# Patient Record
Sex: MALE | Race: OTHER | Employment: UNEMPLOYED | URBAN - METROPOLITAN AREA
[De-identification: names, ages, dates, MRNs, and addresses within clinical notes are randomized per-mention and may not be internally consistent; named-entity substitution may affect disease eponyms.]

---

## 2021-12-07 ENCOUNTER — APPOINTMENT (OUTPATIENT)
Dept: GENERAL RADIOLOGY | Age: 26
End: 2021-12-07
Payer: MEDICAID

## 2021-12-07 ENCOUNTER — APPOINTMENT (OUTPATIENT)
Dept: CT IMAGING | Age: 26
End: 2021-12-07
Payer: MEDICAID

## 2021-12-07 ENCOUNTER — HOSPITAL ENCOUNTER (EMERGENCY)
Age: 26
Discharge: ANOTHER HOSPITAL | End: 2021-12-07
Attending: EMERGENCY MEDICINE
Payer: MEDICAID

## 2021-12-07 ENCOUNTER — HOSPITAL ENCOUNTER (INPATIENT)
Age: 26
LOS: 4 days | Discharge: HOME | DRG: 751 | End: 2021-12-11
Attending: PSYCHIATRY & NEUROLOGY | Admitting: PSYCHIATRY & NEUROLOGY
Payer: MEDICAID

## 2021-12-07 VITALS
OXYGEN SATURATION: 98 % | SYSTOLIC BLOOD PRESSURE: 110 MMHG | RESPIRATION RATE: 18 BRPM | WEIGHT: 200 LBS | HEART RATE: 75 BPM | TEMPERATURE: 98 F | BODY MASS INDEX: 29.62 KG/M2 | DIASTOLIC BLOOD PRESSURE: 70 MMHG | HEIGHT: 69 IN

## 2021-12-07 DIAGNOSIS — F19.10 POLYSUBSTANCE ABUSE (HCC): ICD-10-CM

## 2021-12-07 DIAGNOSIS — R07.9 NONSPECIFIC CHEST PAIN: ICD-10-CM

## 2021-12-07 DIAGNOSIS — R45.851 SUICIDAL IDEATION: Primary | ICD-10-CM

## 2021-12-07 PROBLEM — F32.A DEPRESSION: Status: ACTIVE | Noted: 2021-12-07

## 2021-12-07 LAB
ACETAMINOPHEN LEVEL: <5 UG/ML (ref 10–30)
ALBUMIN SERPL-MCNC: 4.4 G/DL (ref 3.4–5)
ALP BLD-CCNC: 67 U/L (ref 40–129)
ALT SERPL-CCNC: 17 U/L (ref 10–40)
AMPHETAMINE SCREEN, URINE: POSITIVE
ANION GAP SERPL CALCULATED.3IONS-SCNC: 15 MMOL/L (ref 3–16)
AST SERPL-CCNC: 24 U/L (ref 15–37)
BARBITURATE SCREEN URINE: ABNORMAL
BASOPHILS ABSOLUTE: 0 K/UL (ref 0–0.2)
BASOPHILS RELATIVE PERCENT: 0.3 %
BENZODIAZEPINE SCREEN, URINE: ABNORMAL
BILIRUB SERPL-MCNC: 0.3 MG/DL (ref 0–1)
BILIRUBIN DIRECT: <0.2 MG/DL (ref 0–0.3)
BILIRUBIN URINE: NEGATIVE
BILIRUBIN, INDIRECT: NORMAL MG/DL (ref 0–1)
BLOOD, URINE: NEGATIVE
BUN BLDV-MCNC: 9 MG/DL (ref 7–20)
CALCIUM SERPL-MCNC: 9.5 MG/DL (ref 8.3–10.6)
CANNABINOID SCREEN URINE: POSITIVE
CHLORIDE BLD-SCNC: 102 MMOL/L (ref 99–110)
CLARITY: CLEAR
CO2: 23 MMOL/L (ref 21–32)
COCAINE METABOLITE SCREEN URINE: ABNORMAL
COLOR: YELLOW
CREAT SERPL-MCNC: 1.1 MG/DL (ref 0.9–1.3)
EOSINOPHILS ABSOLUTE: 0.1 K/UL (ref 0–0.6)
EOSINOPHILS RELATIVE PERCENT: 1.1 %
ETHANOL: NORMAL MG/DL (ref 0–0.08)
GFR AFRICAN AMERICAN: >60
GFR NON-AFRICAN AMERICAN: >60
GLUCOSE BLD-MCNC: 90 MG/DL (ref 70–99)
GLUCOSE URINE: NEGATIVE MG/DL
HCT VFR BLD CALC: 44.2 % (ref 40.5–52.5)
HEMOGLOBIN: 14.2 G/DL (ref 13.5–17.5)
KETONES, URINE: ABNORMAL MG/DL
LEUKOCYTE ESTERASE, URINE: NEGATIVE
LYMPHOCYTES ABSOLUTE: 2.5 K/UL (ref 1–5.1)
LYMPHOCYTES RELATIVE PERCENT: 21.7 %
Lab: ABNORMAL
MAGNESIUM: 2.4 MG/DL (ref 1.8–2.4)
MCH RBC QN AUTO: 23.9 PG (ref 26–34)
MCHC RBC AUTO-ENTMCNC: 32.2 G/DL (ref 31–36)
MCV RBC AUTO: 74.3 FL (ref 80–100)
METHADONE SCREEN, URINE: ABNORMAL
MICROSCOPIC EXAMINATION: ABNORMAL
MONOCYTES ABSOLUTE: 1.1 K/UL (ref 0–1.3)
MONOCYTES RELATIVE PERCENT: 9.3 %
NEUTROPHILS ABSOLUTE: 7.8 K/UL (ref 1.7–7.7)
NEUTROPHILS RELATIVE PERCENT: 67.6 %
NITRITE, URINE: NEGATIVE
OPIATE SCREEN URINE: ABNORMAL
OXYCODONE URINE: ABNORMAL
PDW BLD-RTO: 16.9 % (ref 12.4–15.4)
PH UA: 6
PH UA: 6 (ref 5–8)
PHENCYCLIDINE SCREEN URINE: ABNORMAL
PLATELET # BLD: 317 K/UL (ref 135–450)
PMV BLD AUTO: 8.4 FL (ref 5–10.5)
POTASSIUM REFLEX MAGNESIUM: 3.5 MMOL/L (ref 3.5–5.1)
PROPOXYPHENE SCREEN: ABNORMAL
PROTEIN UA: NEGATIVE MG/DL
RBC # BLD: 5.95 M/UL (ref 4.2–5.9)
SALICYLATE, SERUM: <0.3 MG/DL (ref 15–30)
SARS-COV-2, NAAT: NOT DETECTED
SODIUM BLD-SCNC: 140 MMOL/L (ref 136–145)
SPECIFIC GRAVITY UA: 1.02 (ref 1–1.03)
TOTAL PROTEIN: 7 G/DL (ref 6.4–8.2)
TROPONIN: <0.01 NG/ML
URINE REFLEX TO CULTURE: ABNORMAL
URINE TYPE: ABNORMAL
UROBILINOGEN, URINE: 1 E.U./DL
WBC # BLD: 11.5 K/UL (ref 4–11)

## 2021-12-07 PROCEDURE — 81003 URINALYSIS AUTO W/O SCOPE: CPT

## 2021-12-07 PROCEDURE — 99285 EMERGENCY DEPT VISIT HI MDM: CPT

## 2021-12-07 PROCEDURE — 82077 ASSAY SPEC XCP UR&BREATH IA: CPT

## 2021-12-07 PROCEDURE — 83735 ASSAY OF MAGNESIUM: CPT

## 2021-12-07 PROCEDURE — 80307 DRUG TEST PRSMV CHEM ANLYZR: CPT

## 2021-12-07 PROCEDURE — 36415 COLL VENOUS BLD VENIPUNCTURE: CPT

## 2021-12-07 PROCEDURE — 87635 SARS-COV-2 COVID-19 AMP PRB: CPT

## 2021-12-07 PROCEDURE — 80048 BASIC METABOLIC PNL TOTAL CA: CPT

## 2021-12-07 PROCEDURE — 80076 HEPATIC FUNCTION PANEL: CPT

## 2021-12-07 PROCEDURE — 1240000000 HC EMOTIONAL WELLNESS R&B

## 2021-12-07 PROCEDURE — 71045 X-RAY EXAM CHEST 1 VIEW: CPT

## 2021-12-07 PROCEDURE — 85025 COMPLETE CBC W/AUTO DIFF WBC: CPT

## 2021-12-07 PROCEDURE — 84484 ASSAY OF TROPONIN QUANT: CPT

## 2021-12-07 PROCEDURE — 80179 DRUG ASSAY SALICYLATE: CPT

## 2021-12-07 PROCEDURE — 93005 ELECTROCARDIOGRAM TRACING: CPT | Performed by: EMERGENCY MEDICINE

## 2021-12-07 PROCEDURE — 80143 DRUG ASSAY ACETAMINOPHEN: CPT

## 2021-12-07 PROCEDURE — 70450 CT HEAD/BRAIN W/O DYE: CPT

## 2021-12-07 RX ORDER — ACETAMINOPHEN 325 MG/1
650 TABLET ORAL EVERY 4 HOURS PRN
Status: DISCONTINUED | OUTPATIENT
Start: 2021-12-07 | End: 2021-12-11 | Stop reason: HOSPADM

## 2021-12-07 RX ORDER — HYDROXYZINE PAMOATE 50 MG/1
50 CAPSULE ORAL EVERY 6 HOURS PRN
Status: DISCONTINUED | OUTPATIENT
Start: 2021-12-07 | End: 2021-12-11 | Stop reason: HOSPADM

## 2021-12-07 RX ORDER — TRAZODONE HYDROCHLORIDE 50 MG/1
50 TABLET ORAL NIGHTLY PRN
Status: DISCONTINUED | OUTPATIENT
Start: 2021-12-07 | End: 2021-12-11 | Stop reason: HOSPADM

## 2021-12-07 RX ORDER — IBUPROFEN 800 MG/1
800 TABLET ORAL EVERY 8 HOURS PRN
Status: DISCONTINUED | OUTPATIENT
Start: 2021-12-07 | End: 2021-12-11 | Stop reason: HOSPADM

## 2021-12-07 RX ORDER — POLYETHYLENE GLYCOL 3350 17 G
2 POWDER IN PACKET (EA) ORAL
Status: DISCONTINUED | OUTPATIENT
Start: 2021-12-07 | End: 2021-12-10 | Stop reason: SDDI

## 2021-12-07 RX ORDER — ACETAMINOPHEN 325 MG/1
650 TABLET ORAL EVERY 4 HOURS PRN
Status: DISCONTINUED | OUTPATIENT
Start: 2021-12-07 | End: 2021-12-07

## 2021-12-07 ASSESSMENT — PAIN SCALES - GENERAL
PAINLEVEL_OUTOF10: 3
PAINLEVEL_OUTOF10: 0

## 2021-12-07 ASSESSMENT — SLEEP AND FATIGUE QUESTIONNAIRES: SLEEP PATTERN: UTA

## 2021-12-07 ASSESSMENT — PAIN DESCRIPTION - PAIN TYPE: TYPE: ACUTE PAIN

## 2021-12-07 ASSESSMENT — HEART SCORE: ECG: 1

## 2021-12-07 NOTE — ED NOTES
Pt endorsed to [employer]. Pt handed off in stable condition with no acute signs of distress noted. Pt sleeping in stretcher with no acute signs of distress noted.       Karolyn James RN  12/07/21 4106

## 2021-12-07 NOTE — ED NOTES
Pt sleeping in stretcher with no acute signs of distress noted. Pt sitter in close proximity.       Satya Moya RN  12/07/21 4846

## 2021-12-07 NOTE — ED NOTES
Pt asleep in stretcher with no acute signs of distress noted. Pt sitter in close proximity.       Valentino Ricardo RN  12/07/21 3671

## 2021-12-07 NOTE — ED NOTES
Pt report and paperwork given to [facility]. Pt made aware of transfer and verbalized understanding. Pt belongings retrieved from security and given to [facility]. Pt was secured to the stretcher and wheeled from the ED without incident. Pt allowed opportunity to ask questions and declined any questions at this time. Pts preferred contact Triny Merino was updated on his transfer. Report called to Kapil Gonzalez RN at [facility].      Jenness Minors) Bayron Basilio RN  12/07/21 9818

## 2021-12-07 NOTE — ED PROVIDER NOTES
EMERGENCY DEPARTMENT PROVIDER NOTE    Patient Identification  Pt Name: Bautista Perla  MRN: [id number]  [place] 1995  Date of evaluation: 12/7/2021  Provider: Destini Daniels DO  PCP: Yoan Mckeon    Chief Complaint  Suicidal ideation      HPI  (History provided by patient)  This is a 32 y.o. male with pertinent past medical history of ADHD who was brought in by family for depression, patient states he has had worsening depression over the past week. Over the past several days he has had thoughts about committing suicide, states he would plan to hang himself. Patient is unable to identify any clear triggers for his worsening symptoms on my evaluation. Nothing in particular seems to make him feel any better or worse. He denies history of depression or suicide attempts in the past.  Denies prior psychiatric hospitalizations. Reports he has been drinking alcohol tonight, however denies any other recreational drug use. Patient also states his chest feels \"terrible\" ongoing for the past week as well. Patient is very drowsy during my initial interview and has to be repeatedly awakened to answer questions. ROS    Const:  No fevers, no chills, +fatigue  Skin:  No rash, no lesions  Eyes:  No visual changes, no blurry or double vision, no pain  ENT:  No sore throat, no difficulty swallowing, no ear pain, no sinus pain or congestion  Card:  +chest pain, no palpitations, no edema  Resp:  No shortness of breath, no cough, no wheezing  Abd:  No abdominal pain, no nausea, no vomiting, no diarrhea  Genitourinary:  No dysuria, no hematuria  MSK:  No joint pain, no myalgia  Neuro:  No focal weakness, no headache, no paresthesia    All other systems reviewed and negative unless otherwise noted in HPI        I have reviewed the following nursing documentation:  Allergies: Patient has no known allergies.     Past medical history:   Past Medical History:   Diagnosis Date    ADD (attention deficit disorder with mm J-point elevations in septal and lateral leads, no reciprocal ST depressions, T wave inversions V2 and V3, impression NSR with nonspecific ST-T morphology, suspect benign early repolarization, no STEMI, no comparison available      Radiology  XR CHEST PORTABLE   Final Result   No significant findings in the chest.         CT Head WO Contrast   Final Result   No acute intracranial abnormality.              Labs  Results for orders placed or performed during the hospital encounter of 12/07/21   COVID-19, Rapid    Specimen: Nasopharyngeal Swab   Result Value Ref Range    SARS-CoV-2, NAAT Not Detected Not Detected   CBC Auto Differential   Result Value Ref Range    WBC 11.5 (H) 4.0 - 11.0 K/uL    RBC 5.95 (H) 4.20 - 5.90 M/uL    Hemoglobin 14.2 13.5 - 17.5 g/dL    Hematocrit 44.2 40.5 - 52.5 %    MCV 74.3 (L) 80.0 - 100.0 fL    MCH 23.9 (L) 26.0 - 34.0 pg    MCHC 32.2 31.0 - 36.0 g/dL    RDW 16.9 (H) 12.4 - 15.4 %    Platelets 143 565 - 614 K/uL    MPV 8.4 5.0 - 10.5 fL    Neutrophils % 67.6 %    Lymphocytes % 21.7 %    Monocytes % 9.3 %    Eosinophils % 1.1 %    Basophils % 0.3 %    Neutrophils Absolute 7.8 (H) 1.7 - 7.7 K/uL    Lymphocytes Absolute 2.5 1.0 - 5.1 K/uL    Monocytes Absolute 1.1 0.0 - 1.3 K/uL    Eosinophils Absolute 0.1 0.0 - 0.6 K/uL    Basophils Absolute 0.0 0.0 - 0.2 K/uL   Basic Metabolic Panel w/ Reflex to MG   Result Value Ref Range    Sodium 140 136 - 145 mmol/L    Potassium reflex Magnesium 3.5 3.5 - 5.1 mmol/L    Chloride 102 99 - 110 mmol/L    CO2 23 21 - 32 mmol/L    Anion Gap 15 3 - 16    Glucose 90 70 - 99 mg/dL    BUN 9 7 - 20 mg/dL    CREATININE 1.1 0.9 - 1.3 mg/dL    GFR Non-African American >60 >60    GFR African American >60 >60    Calcium 9.5 8.3 - 10.6 mg/dL   Troponin   Result Value Ref Range    Troponin <0.01 <0.01 ng/mL   Urinalysis Reflex to Culture    Specimen: Urine, clean catch   Result Value Ref Range    Color, UA YELLOW Straw/Yellow    Clarity, UA Clear Clear    Glucose, Ur Negative Negative mg/dL    Bilirubin Urine Negative Negative    Ketones, Urine TRACE (A) Negative mg/dL    Specific Gravity, UA 1.025 1.005 - 1.030    Blood, Urine Negative Negative    pH, UA 6.0 5.0 - 8.0    Protein, UA Negative Negative mg/dL    Urobilinogen, Urine 1.0 <2.0 E.U./dL    Nitrite, Urine Negative Negative    Leukocyte Esterase, Urine Negative Negative    Microscopic Examination Not Indicated     Urine Type NotGiven     Urine Reflex to Culture Not Indicated    Urine Drug Screen   Result Value Ref Range    Amphetamine Screen, Urine POSITIVE (A) Negative <1000ng/mL    Barbiturate Screen, Ur Neg Negative <200 ng/mL    Benzodiazepine Screen, Urine Neg Negative <200 ng/mL    Cannabinoid Scrn, Ur POSITIVE (A) Negative <50 ng/mL    Cocaine Metabolite Screen, Urine Neg Negative <300 ng/mL    Opiate Scrn, Ur Neg Negative <300 ng/mL    PCP Screen, Urine Neg Negative <25 ng/mL    Methadone Screen, Urine Neg Negative <300 ng/mL    Propoxyphene Scrn, Ur Neg Negative <300 ng/mL    Oxycodone Urine Neg Negative <100 ng/ml    pH, UA 6.0     Drug Screen Comment: see below    Ethanol   Result Value Ref Range    Ethanol Lvl None Detected mg/dL   Acetaminophen level   Result Value Ref Range    Acetaminophen Level <5 (L) 10 - 30 ug/mL   Salicylate   Result Value Ref Range    Salicylate, Serum <9.1 (L) 15.0 - 30.0 mg/dL   Magnesium   Result Value Ref Range    Magnesium 2.40 1.80 - 2.40 mg/dL   EKG 12 Lead   Result Value Ref Range    Ventricular Rate 69 BPM    Atrial Rate 69 BPM    P-R Interval 154 ms    QRS Duration 92 ms    Q-T Interval 376 ms    QTc Calculation (Bazett) 402 ms    P Axis 42 degrees    R Axis 60 degrees    T Axis 39 degrees    Diagnosis       Sinus rhythm with marked sinus arrhythmiaST elevation, consider early repolarization, pericarditis, or injuryT wave abnormality, consider anterior ischemia       Screenings     Heart Score for chest pain patients  History: Slightly Suspicious  ECG: Non-Specifc repolarization disturbance/LBTB/PM  Patient Age: < 45 years  *Risk factors for Atherosclerotic disease: Cigarette smoking  Risk Factors: 1 or 2 risk factors  Troponin: < 1X normal limit  Heart Score Total: 2     MDM and ED Course    Patient afebrile and nontoxic. No distress. AVSS, no hypoxia or increased work of breathing. On my initial evaluation, patient is drowsy, however he is fully oriented, answers questions appropriately. Neuro exam is nonfocal.  CT head shows no evidence of hemorrhage, mass-effect or other acute process. EKG with nonspecific ST-T morphology, patient is of young age with no cardiovascular risk factors aside from tobacco use and EKG is most consistent with benign early repolarization. Low risk by HEART score. Low suspicion for pericarditis. Laboratory work-up is without evidence of endorgan dysfunction or clinically significant electrolyte derangement. Tox work-up shows cannabinoids and amphetamines, EtOH negative, acetaminophen and salicylates negative. Patient continues to report suicidal ideation on my exam with plan to hang himself. Patient would benefit from transfer for urgent psychiatric evaluation. He was placed on a psychiatric hold. At this time he is medically cleared for transfer. Final Impression  1. Suicidal ideation    2. Polysubstance abuse ([facility] [address]    3. Nonspecific chest pain        Blood pressure 127/81, pulse 72, temperature 98 °F (36.7 °C), temperature source Oral, resp. rate 20, height 5' 9\" (1.753 m), weight 200 lb (90.7 kg), SpO2 100 %. Disposition:  DISPOSITION        Patient Referrals:  No follow-up provider specified. Discharge Medications:  New Prescriptions    No medications on file       Discontinued Medications:  Discontinued Medications    No medications on file       This chart was generated using the [employer] dictation system. I created this record but it may contain dictation errors given the limitations of this technology.     Matthew Ricardo DO Sheila (electronically signed)  Attending Emergency Physician       Maday Humphreys DO  12/07/21 7529

## 2021-12-07 NOTE — ED NOTES
Pt report received from [facility]. No acute signs of distress noted. Pt is asleep in bed.       Michell Long RN  12/07/21 81 [facility] ([facility]uy Jha) Sarah Long RN  12/07/21 4766

## 2021-12-08 PROBLEM — F33.9 MAJOR DEPRESSION, RECURRENT (HCC): Status: ACTIVE | Noted: 2021-12-08

## 2021-12-08 LAB
EKG ATRIAL RATE: 69 BPM
EKG DIAGNOSIS: NORMAL
EKG P AXIS: 42 DEGREES
EKG P-R INTERVAL: 154 MS
EKG Q-T INTERVAL: 376 MS
EKG QRS DURATION: 92 MS
EKG QTC CALCULATION (BAZETT): 402 MS
EKG R AXIS: 60 DEGREES
EKG T AXIS: 39 DEGREES
EKG VENTRICULAR RATE: 69 BPM

## 2021-12-08 PROCEDURE — 93010 ELECTROCARDIOGRAM REPORT: CPT | Performed by: INTERNAL MEDICINE

## 2021-12-08 PROCEDURE — 99223 1ST HOSP IP/OBS HIGH 75: CPT | Performed by: PSYCHIATRY & NEUROLOGY

## 2021-12-08 PROCEDURE — 6370000000 HC RX 637 (ALT 250 FOR IP): Performed by: PSYCHIATRY & NEUROLOGY

## 2021-12-08 PROCEDURE — 1240000000 HC EMOTIONAL WELLNESS R&B

## 2021-12-08 RX ORDER — OLANZAPINE 10 MG/1
10 INJECTION, POWDER, LYOPHILIZED, FOR SOLUTION INTRAMUSCULAR EVERY 8 HOURS PRN
Status: DISCONTINUED | OUTPATIENT
Start: 2021-12-08 | End: 2021-12-11 | Stop reason: HOSPADM

## 2021-12-08 RX ORDER — MAGNESIUM HYDROXIDE/ALUMINUM HYDROXICE/SIMETHICONE 120; 1200; 1200 MG/30ML; MG/30ML; MG/30ML
30 SUSPENSION ORAL EVERY 6 HOURS PRN
Status: DISCONTINUED | OUTPATIENT
Start: 2021-12-08 | End: 2021-12-11 | Stop reason: HOSPADM

## 2021-12-08 RX ORDER — ACETAMINOPHEN 325 MG/1
650 TABLET ORAL EVERY 4 HOURS PRN
Status: DISCONTINUED | OUTPATIENT
Start: 2021-12-08 | End: 2021-12-08

## 2021-12-08 RX ORDER — HYDROXYZINE PAMOATE 50 MG/1
50 CAPSULE ORAL EVERY 6 HOURS PRN
Status: DISCONTINUED | OUTPATIENT
Start: 2021-12-08 | End: 2021-12-08

## 2021-12-08 RX ORDER — OLANZAPINE 10 MG/1
10 TABLET ORAL EVERY 8 HOURS PRN
Status: DISCONTINUED | OUTPATIENT
Start: 2021-12-08 | End: 2021-12-11 | Stop reason: HOSPADM

## 2021-12-08 RX ORDER — TRAZODONE HYDROCHLORIDE 50 MG/1
50 TABLET ORAL NIGHTLY PRN
Status: DISCONTINUED | OUTPATIENT
Start: 2021-12-08 | End: 2021-12-08

## 2021-12-08 RX ORDER — BENZTROPINE MESYLATE 1 MG/ML
2 INJECTION INTRAMUSCULAR; INTRAVENOUS 2 TIMES DAILY PRN
Status: DISCONTINUED | OUTPATIENT
Start: 2021-12-08 | End: 2021-12-11 | Stop reason: HOSPADM

## 2021-12-08 RX ORDER — IBUPROFEN 400 MG/1
400 TABLET ORAL EVERY 6 HOURS PRN
Status: DISCONTINUED | OUTPATIENT
Start: 2021-12-08 | End: 2021-12-08

## 2021-12-08 RX ADMIN — TRAZODONE HYDROCHLORIDE 50 MG: 50 TABLET ORAL at 20:52

## 2021-12-08 ASSESSMENT — LIFESTYLE VARIABLES: HISTORY_ALCOHOL_USE: COMMENT

## 2021-12-08 ASSESSMENT — PAIN SCALES - GENERAL: PAINLEVEL_OUTOF10: 0

## 2021-12-08 NOTE — FLOWSHEET NOTE
Purposeful Rounding    Patient Location: Patient room    Patient willing to engage in conversation: No    Presentation/behavior: Withdrawn    Affect: Flat/blunted    Concerns reported: none, pt withdrawn     PRN medications given: none    Environmental assessment: Room free from clutter, Clear path to bathroom  and No safety hazards noted    Fall prevention interventions in place: Yellow non-skid socks on    Daily Hayes Fall Risk Score: 59    Daily Armenta Fall Risk Score: low      Electronically signed by Vanessa Umaña RN on 12/8/21 at 10:47 AM EST

## 2021-12-08 NOTE — FLOWSHEET NOTE
Purposeful Rounding    Patient Location: Patient room    Patient willing to engage in conversation: No    Presentation/behavior: Other sleeping*    Affect: Neutral/Euthymic(normal)    Concerns reported: pt noted to be resting, eyes closed, respirations even and easy    PRN medications given: none    Environmental assessment: Room free from clutter, Clear path to bathroom  and No safety hazards noted    Fall prevention interventions in place: Yellow non-skid socks on    Daily Ozan Fall Risk Score: 59    Daily Armenta Fall Risk Score: low      Electronically signed by Ra Vargas RN on 12/8/21 at 4:25 PM EST

## 2021-12-08 NOTE — FLOWSHEET NOTE
Purposeful Rounding    Patient Location: Patient room    Patient willing to engage in conversation: No    Presentation/behavior: Withdrawn    Affect: Neutral/Euthymic(normal)    Concerns reported: pt refuses to answer but is awake    PRN medications given: none    Environmental assessment: Room free from clutter, Clear path to bathroom  and No safety hazards noted    Fall prevention interventions in place: Yellow non-skid socks on    Daily Vernon Fall Risk Score: 59    Daily Armenta Fall Risk Score: low      Electronically signed by Indu Sena RN on 12/8/21 at 2:19 PM EST
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
## 2021-12-08 NOTE — PROGRESS NOTES
Patient gave verbal permission for his step-mother,  Alix Huber to receive information regarding his care. ISABEL form updated.
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
## 2021-12-08 NOTE — PROGRESS NOTES
..Purposeful Rounding    Patient Location: Patient room    Patient willing to engage in conversation: No    Presentation/behavior: Withdrawn and Resistive    Affect: Depressed    Concerns reported: no    PRN medications given: no    Environmental assessment: Room free from clutter, Clear path to bathroom  and Adequate lighting    Fall prevention interventions in place: Lighting appropriate, Room free of clutter and Clear path to bathroom    Daily Hallie Fall Risk Score: 59    Daily Armenta Fall Risk Score: low        Electronically signed by Tanner Gonzalez RN on 12/7/21 at 8:16 PM EST Ambulatory

## 2021-12-08 NOTE — PROGRESS NOTES
Behavioral Services  Medicare Certification Upon Admission    I certify that this patient's inpatient psychiatric hospital admission is medically necessary for:    [x] (1) Treatment which could reasonably be expected to improve this patient's condition,       [x] (2) Or for diagnostic study;     AND     [x](2) The inpatient psychiatric services are provided while the individual is under the care of a physician and are included in the individualized plan of care.     Estimated length of stay/service 2-3 d    Plan for post-hospital care outpt    Electronically signed by David Nevarez MD on 12/8/2021 at 11:22 AM

## 2021-12-08 NOTE — FLOWSHEET NOTE
Purposeful Rounding    Patient Location: Patient room    Patient willing to engage in conversation: No    Presentation/behavior: Other resting*    Affect: Neutral/Euthymic(normal)    Concerns reported: pt resting, eyes closed respirations even and easy    PRN medications given: none    Environmental assessment: Room free from clutter, Clear path to bathroom  and No safety hazards noted    Fall prevention interventions in place: Yellow non-skid socks on    Daily Hallie Fall Risk Score: 59    Daily Armenta Fall Risk Score: low      Electronically signed by Vanessa Umaña RN on 12/8/21 at 2:41 PM EST

## 2021-12-08 NOTE — PLAN OF CARE
Problem: Altered Mood, Depressive Behavior:  Goal: Able to verbalize acceptance of life and situations over which he or she has no control  Description: Able to verbalize acceptance of life and situations over which he or she has no control  12/8/2021 1353 by Alfonzo Valero RN  Outcome: Ongoing     Problem: Altered Mood, Depressive Behavior:  Goal: Able to verbalize and/or display a decrease in depressive symptoms  Description: Able to verbalize and/or display a decrease in depressive symptoms  12/8/2021 1353 by Alfonzo Valero RN  Outcome: Ongoing     Problem: Altered Mood, Depressive Behavior:  Goal: Ability to disclose and discuss suicidal ideas will improve  Description: Ability to disclose and discuss suicidal ideas will improve  12/8/2021 1353 by Aflonzo Valero RN  Outcome: Ongoing     Problem: Altered Mood, Depressive Behavior:  Goal: Able to verbalize support systems  Description: Able to verbalize support systems  12/8/2021 1353 by Alfonzo Valero RN  Outcome: Ongoing     Problem: Altered Mood, Depressive Behavior:  Goal: Absence of self-harm  Description: Absence of self-harm  12/8/2021 1353 by Alfonzo Valero RN  Outcome: Ongoing     Problem: Altered Mood, Depressive Behavior:  Goal: Patient specific goal  Description: Patient specific goal  12/8/2021 1353 by Alfonzo Valero RN  Outcome: Ongoing     Patient isolated to room and bed this shift. He did come out to the day room for meals. He had minimal participation in interview process, mostly kept the covers over his head when answering questions. He did deny SI/HI/AVH. He has not had any scheduled medications and did not require any PRN medications this shift. He has been absent of self harm and has not had any angry outburst this shift. He stopped answering questions and stated, \"Just put down whatever you want, that is what happens, people manipulate and make things up. \"  When asked to elaborate, patient did not answer and faced the other direction and again pulled the blanket over his head and refused to engage anymore.

## 2021-12-08 NOTE — FLOWSHEET NOTE
Purposeful Rounding    Patient Location: Patient room    Patient willing to engage in conversation: Yes    Presentation/behavior: Withdrawn    Affect: Flat/blunted    Concerns reported: none    PRN medications given: n/a    Environmental assessment: Room free from clutter, Clear path to bathroom  and No safety hazards noted    Fall prevention interventions in place: Yellow non-skid socks on    Daily Hallie Fall Risk Score: 59    Daily Armenta Fall Risk Score: low      Electronically signed by Kirstin Baldwin RN on 12/8/21 at 8:46 AM EST

## 2021-12-08 NOTE — CARE COORDINATION
[facility]  Treatment Team Note  Day 1    Review Date & Time: 0900 12/8/2021    Patient was not in treatment team      Status EXAM:   Status and Exam  Normal: No  Facial Expression: Flat, Expressionless, Avoids Gaze  Affect: Blunt  Level of Consciousness: Alert  Mood:Normal: No  Mood: Depressed  Motor Activity:Normal: No  Motor Activity: Decreased  Interview Behavior: Uncooperative/Withdrawn, Evasive  Preception: [place] to Person  Attention:Normal: No  Attention: Unable to Concentrate  Thought Processes: Other(See comment) (RAZ)  Thought Content:Normal: Yes  Hallucinations: None, Other (Comment) (denied)  Delusions: No  Memory:Normal:  (RAZ)  Insight and Judgment: No  Insight and Judgment: Poor Judgment, Poor Insight  Present Suicidal Ideation: No  Present Homicidal Ideation: No      Suicide Risk CSSR-S:  1) Within the past month, have you wished you were dead or wished you could go to sleep and not wake up? : Yes  2) Have you actually had any thoughts of killing yourself? : Yes  3) Have you been thinking about how you might kill yourself? : Yes  5) Have you started to work out or worked out the details of how to kill yourself? Do you intend to carry out this plan? : Yes  6) Have you ever done anything, started to do anything, or prepared to do anything to end your life?: Yes      PLAN/TREATMENT RECOMMENDATIONS UPDATE: Patient will take medication as prescribed, eat 75% of meals, attend groups, participate in milieu activities, participate in treatment team and care planning for discharge and follow up.       Maryann Leone RN

## 2021-12-08 NOTE — PLAN OF CARE
Patient refused to converse with me when I entered the room to complete medical H&P. He was resting in bed. He opened his eyes, made eye contact with me but refused to speak to me. I will attempt to examine patient tomorrow.     Magdy Valdes PA-C  12/8/2021 3:16 PM

## 2021-12-08 NOTE — PROGRESS NOTES
...Purposeful Rounding    Patient Location: Patient room    Patient willing to engage in conversation: No    Presentation/behavior: sleeping    Affect: sleeping    Concerns reported: no    PRN medications given: no    Environmental assessment: Room free from clutter, Clear path to bathroom  and Adequate lighting    Fall prevention interventions in place: Lighting appropriate, Room free of clutter and Clear path to bathroom    Daily [place] Fall Risk Score: 59    Daily [place] Fall Risk Score: low      Electronically signed by Keren Craft RN on 12/8/21 at 12:31 AM EST

## 2021-12-08 NOTE — PLAN OF CARE
Problem: Altered Mood, Depressive Behavior:  Goal: Able to verbalize acceptance of life and situations over which he or she has no control  Description: Able to verbalize acceptance of life and situations over which he or she has no control  Outcome: Ongoing  Goal: Able to verbalize and/or display a decrease in depressive symptoms  Description: Able to verbalize and/or display a decrease in depressive symptoms  Outcome: Ongoing  Goal: Ability to disclose and discuss suicidal ideas will improve  Description: Ability to disclose and discuss suicidal ideas will improve  Outcome: Ongoing  Goal: Able to verbalize support systems  Description: Able to verbalize support systems  Outcome: Ongoing  Goal: Absence of self-harm  Description: Absence of self-harm  Outcome: Ongoing  Goal: Patient specific goal  Description: Patient specific goal  Outcome: Ongoing  Goal: Participates in care planning  Description: Participates in care planning  Outcome: Ongoing     Problem: Depressive Behavior With or Without Suicide Precautions:  Goal: Able to verbalize acceptance of life and situations over which he or she has no control  Description: Able to verbalize acceptance of life and situations over which he or she has no control  Outcome: Ongoing  Goal: Able to verbalize and/or display a decrease in depressive symptoms  Description: Able to verbalize and/or display a decrease in depressive symptoms  Outcome: Ongoing  Goal: Ability to disclose and discuss suicidal ideas will improve  Description: Ability to disclose and discuss suicidal ideas will improve  Outcome: Ongoing  Goal: Able to verbalize support systems  Description: Able to verbalize support systems  Outcome: Ongoing  Goal: Absence of self-harm  Description: Absence of self-harm  Outcome: Ongoing  Goal: Patient specific goal  Description: Patient specific goal  Outcome: Ongoing  Goal: Participates in care planning  Description: Participates in care planning  Outcome: Ongoing   Denied SI/HI,A/V hallucinations this evening. He didn't want to participate in completing admission this shift. He was mostly isolative to room but came out for a snack, denied any needs and returned to his room. Safety checks continue Q 15 minutes throughout the shift.

## 2021-12-09 PROCEDURE — 99233 SBSQ HOSP IP/OBS HIGH 50: CPT | Performed by: PSYCHIATRY & NEUROLOGY

## 2021-12-09 PROCEDURE — 1240000000 HC EMOTIONAL WELLNESS R&B

## 2021-12-09 ASSESSMENT — PAIN SCALES - GENERAL: PAINLEVEL_OUTOF10: 0

## 2021-12-09 NOTE — FLOWSHEET NOTE
Purposeful Rounding    Patient Location: Patient room    Patient willing to engage in conversation: No    Presentation/behavior: Other resting*    Affect: Neutral/Euthymic(normal)    Concerns reported: pt noted to be resting, eyes closed, respirations even and easy, would  Not engage with writer    PRN medications given: none    Environmental assessment: Room free from clutter, Clear path to bathroom  and No safety hazards noted    Fall prevention interventions in place: Yellow non-skid socks on    Daily [place] Fall Risk Score: 59    Daily [place] Fall Risk Score: low      Electronically signed by Yvrose Rivera RN on 12/9/21 at 4:43 PM EST chest radiograph/postion of catheter/catheter tip is in the left brachiocephalic vein

## 2021-12-09 NOTE — PROGRESS NOTES
Department of Psychiatry  AttendingProgress Note  Chief Complaint: depression  Cherelle Solorio has been noncompliant with treatment. He stays in bed with head covered and refuses to engage. Will continue to attempt contact. Remains on a 72 hour hold. Patient's chart was reviewed and collaborated with  about the treatment plan. SUBJECTIVE:    Patient is feeling RAZ. Suicidal ideation:  RAZ. Patient does not have medication side effects. ROS: Patient has new complaints: no  Sleeping adequately:  Yes   Appetite adequate: Yes  Attending groups: No:   Visitors:No    OBJECTIVE    Physical  VITALS:  /66   Pulse 78   Temp 98.4 °F (36.9 °C) (Temporal)   Resp 16   Ht 5' 9\" (1.753 m) Comment: Per chart review  Wt 200 lb (90.7 kg) Comment: per chart review  SpO2 97%   BMI 29.53 kg/m²     Mental Status Examination:  Patients appearance was street clothes. Thoughts are RAZ. Homicidal ideations RAZ. No abnormal movements, tics or mannerisms. Memory RAZ A[place] 0. Concentration Unable to Assess. Alert and oriented X 4. Insight and Judgement RAZ. Patient was uncooperative. Patient gait normal. Mood constricted, affect flat affect Hallucinations RAZ, suicidal ideations no specific plan to harm self Speech none  Data  Labs:   No visits with results within 2 Day(s) from this visit.    Latest known visit with results is:   Admission on 12/07/2021, Discharged on 12/07/2021   Component Date Value Ref Range Status    Ventricular Rate 12/07/2021 69  BPM Final    Atrial Rate 12/07/2021 69  BPM Final    P-R Interval 12/07/2021 154  ms Final    QRS Duration 12/07/2021 92  ms Final    Q-T Interval 12/07/2021 376  ms Final    QTc Calculation (Bazett) 12/07/2021 402  ms Final    P Axis 12/07/2021 42  degrees Final    R Axis 12/07/2021 60  degrees Final    T Axis 12/07/2021 39  degrees Final    Diagnosis 12/07/2021 Sinus rhythm with marked sinus arrhythmiaST elevation, consider early repolarization, pericarditis, or injuryT wave abnormality, consider anterior ischemiaConfirmed by Alexandro Porter ([id number]) on 12/8/2021 12:21:54 PM   Final    WBC 12/07/2021 11.5* 4.0 - 11.0 K/uL Final    RBC 12/07/2021 5.95* 4.20 - 5.90 M/uL Final    Hemoglobin 12/07/2021 14.2  13.5 - 17.5 g/dL Final    Hematocrit 12/07/2021 44.2  40.5 - 52.5 % Final    MCV 12/07/2021 74.3* 80.0 - 100.0 fL Final    MCH 12/07/2021 23.9* 26.0 - 34.0 pg Final    MCHC 12/07/2021 32.2  31.0 - 36.0 g/dL Final    RDW 12/07/2021 16.9* 12.4 - 15.4 % Final    Platelets 36/33/4644 317  135 - 450 K/uL Final    MPV 12/07/2021 8.4  5.0 - 10.5 fL Final    Neutrophils % 12/07/2021 67.6  % Final    Lymphocytes % 12/07/2021 21.7  % Final    Monocytes % 12/07/2021 9.3  % Final    Eosinophils % 12/07/2021 1.1  % Final    Basophils % 12/07/2021 0.3  % Final    Neutrophils Absolute 12/07/2021 7.8* 1.7 - 7.7 K/uL Final    Lymphocytes Absolute 12/07/2021 2.5  1.0 - 5.1 K/uL Final    Monocytes Absolute 12/07/2021 1.1  0.0 - 1.3 K/uL Final    Eosinophils Absolute 12/07/2021 0.1  0.0 - 0.6 K/uL Final    Basophils Absolute 12/07/2021 0.0  0.0 - 0.2 K/uL Final    Sodium 12/07/2021 140  136 - 145 mmol/L Final    Potassium reflex Magnesium 12/07/2021 3.5  3.5 - 5.1 mmol/L Final    Chloride 12/07/2021 102  99 - 110 mmol/L Final    CO2 12/07/2021 23  21 - 32 mmol/L Final    Anion Gap 12/07/2021 15  3 - 16 Final    Glucose 12/07/2021 90  70 - 99 mg/dL Final    BUN 12/07/2021 9  7 - 20 mg/dL Final    CREATININE 12/07/2021 1.1  0.9 - 1.3 mg/dL Final    GFR Non- 12/07/2021 >60  >60 Final    Comment: >60 mL/min/1.73m2 EGFR, calc. for ages 25 and older using the  MDRD formula (not corrected for weight), is valid for stable  renal function.  GFR  12/07/2021 >60  >60 Final    Comment: Chronic Kidney Disease: less than 60 ml/min/1.73 sq.m. Kidney Failure: less than 15 ml/min/1.73 sq.m.   Results valid for patients 18 years and older.      Calcium 12/07/2021 9.5  8.3 - 10.6 mg/dL Final    Troponin 12/07/2021 <0.01  <0.01 ng/mL Final    Methodology by Troponin T    Color, UA 12/07/2021 YELLOW  Straw/Yellow Final    Clarity, UA 12/07/2021 Clear  Clear Final    Glucose, Ur 12/07/2021 Negative  Negative mg/dL Final    Bilirubin Urine 12/07/2021 Negative  Negative Final    Ketones, Urine 12/07/2021 TRACE* Negative mg/dL Final    Specific Gravity, UA 12/07/2021 1.025  1.005 - 1.030 Final    Blood, Urine 12/07/2021 Negative  Negative Final    pH, UA 12/07/2021 6.0  5.0 - 8.0 Final    Protein, UA 12/07/2021 Negative  Negative mg/dL Final    Urobilinogen, Urine 12/07/2021 1.0  <2.0 E.U./dL Final    Nitrite, Urine 12/07/2021 Negative  Negative Final    Leukocyte Esterase, Urine 12/07/2021 Negative  Negative Final    Microscopic Examination 12/07/2021 Not Indicated   Final    Urine Type 12/07/2021 NotGiven   Final    Urine received in a container without preservatives.  Urine Reflex to Culture 12/07/2021 Not Indicated   Final    Amphetamine Screen, Urine 12/07/2021 POSITIVE* Negative <1000ng/mL Final    Comment: High concentrations of ephedrine/pseudoephedrine or  phenylpropanolamine may cause false positive results  for amphetamine. Therefore, confirmatory testing for  amphetamine should be considered if clinically indicated.  Barbiturate Screen, Ur 12/07/2021 Neg  Negative <200 ng/mL Final    Benzodiazepine Screen, Urine 12/07/2021 Neg  Negative <200 ng/mL Final    Cannabinoid Scrn, Ur 12/07/2021 POSITIVE* Negative <50 ng/mL Final    Cocaine Metabolite Screen, Urine 12/07/2021 Neg  Negative <300 ng/mL Final    Opiate Scrn, Ur 12/07/2021 Neg  Negative <300 ng/mL Final    Comment: \"Therapeutic levels of pain medication, especially oxycontin and synthetic  opioids, may not be detected by this Methodology. Pain management screen  panel  Drug panel-PM-Hi Res Ur, Interp (PAIN) should be considered for drug  monitoring \".  PCP Screen, Urine 12/07/2021 Neg  Negative <25 ng/mL Final    Methadone Screen, Urine 12/07/2021 Neg  Negative <300 ng/mL Final    Propoxyphene Scrn, Ur 12/07/2021 Neg  Negative <300 ng/mL Final    Oxycodone Urine 12/07/2021 Neg  Negative <100 ng/ml Final    pH, UA 12/07/2021 6.0   Final    Comment: Urine pH less than 5.0 or greater than 8.0 may indicate sample adulteration. Another sample should be collected if clinically  indicated.  Drug Screen Comment: 12/07/2021 see below   Final    Comment: This method is a screening test to detect only these drug  classes as part of a medical workup. Confirmatory testing  by another method should be ordered if clinically indicated.  Ethanol Lvl 12/07/2021 None Detected  mg/dL Final    Comment:    None Detected  Conversion factor:  100 mg/dl = .100 g/dl  For Medical Purposes Only      Acetaminophen Level 12/07/2021 <5* 10 - 30 ug/mL Final    Comment: Therapeutic Range: 10.0-30.0 ug/mL  Toxic: >=961 ug/mL      Salicylate, Serum 75/64/9639 <0.3* 15.0 - 30.0 mg/dL Final    Comment: Therapeutic Range: 15.0-30.0 mg/dL  Toxic: >30.0 mg/dL      SARS-CoV-2, NAAT 12/07/2021 Not Detected  Not Detected Final    Comment: Rapid NAAT:   Negative results should be treated as presumptive and,  if inconsistent with clinical signs and symptoms or necessary for  patient management, should be tested with an alternative molecular  assay. Negative results do not preclude SARS-CoV-2 infection and  should not be used as the sole basis for patient management decisions. This test has been authorized by the FDA under an Emergency Use  Authorization (EUA) for use by authorized laboratories.     Fact sheet for Healthcare Providers:  BuildHer.es  Fact sheet for Patients: BuildHer.es    METHODOLOGY: Isothermal Nucleic Acid Amplification      Total Protein 12/07/2021 7.0  6.4 - 8.2 g/dL Final    Albumin 12/07/2021 4.4  3.4 - 5.0 g/dL Final    Alkaline Phosphatase 12/07/2021 67  40 - 129 U/L Final    ALT 12/07/2021 17  10 - 40 U/L Final    AST 12/07/2021 24  15 - 37 U/L Final    Total Bilirubin 12/07/2021 0.3  0.0 - 1.0 mg/dL Final    Bilirubin, Direct 12/07/2021 <0.2  0.0 - 0.3 mg/dL Final    Bilirubin, Indirect 12/07/2021 see below  0.0 - 1.0 mg/dL Final    Comment: Indirect Bilirubin cannot be calculated since Total Bilirubin  and/or Direct Bilirubin is below measurable range.  Magnesium 12/07/2021 2.40  1.80 - 2.40 mg/dL Final            Medications  Current Facility-Administered Medications: magnesium hydroxide (MILK OF MAGNESIA) 400 MG/5ML suspension 30 mL, 30 mL, Oral, Daily PRN  aluminum & magnesium hydroxide-simethicone (MAALOX) 200-200-20 MG/5ML suspension 30 mL, 30 mL, Oral, Q6H PRN  OLANZapine (ZYPREXA) tablet 10 mg, 10 mg, Oral, Q8H PRN **OR** OLANZapine (ZYPREXA) injection 10 mg, 10 mg, IntraMUSCular, Q8H PRN  sterile water injection 2.1 mL, 2.1 mL, IntraMUSCular, Q4H PRN  benztropine mesylate (COGENTIN) injection 2 mg, 2 mg, IntraMUSCular, BID PRN  nicotine polacrilex (COMMIT) lozenge 2 mg, 2 mg, Oral, Q1H PRN  acetaminophen (TYLENOL) tablet 650 mg, 650 mg, Oral, Q4H PRN  ibuprofen (ADVIL;MOTRIN) tablet 800 mg, 800 mg, Oral, Q8H PRN  hydrOXYzine (VISTARIL) capsule 50 mg, 50 mg, Oral, Q6H PRN  traZODone (DESYREL) tablet 50 mg, 50 mg, Oral, Nightly PRN    ASSESSMENT AND PLAN    Principal Problem:    Depression, unspecified  Active Problems:    Major depression, recurrent ([facility] [address]  Resolved Problems:    * No resolved hospital problems. *       1. Patient s symptoms   RAZ  2. Probable discharge is UTD  3. Discharge planning is incomplete  4. Suicidal ideation is RAZ  5. Total time with patient was 40 minutes and more than 50 % of that time was spent counseling the patient on their symptoms, treatment and expected goals.

## 2021-12-09 NOTE — FLOWSHEET NOTE
Purposeful Rounding    Patient Location: Patient room    Patient willing to engage in conversation: No    Presentation/behavior: Withdrawn    Affect: Flat/blunted    Concerns reported: none, pt was out in day room for lunch but returned to his room     PRN medications given: none    Environmental assessment: Room free from clutter, Clear path to bathroom  and No safety hazards noted    Fall prevention interventions in place: Yellow non-skid socks on    Daily [place] Fall Risk Score: 59    Daily [place] Fall Risk Score: low      Electronically signed by Ra Vargas RN on 12/9/21 at 1:41 PM EST

## 2021-12-09 NOTE — PLAN OF CARE
Problem: Altered Mood, Depressive Behavior:  Goal: Able to verbalize acceptance of life and situations over which he or she has no control  Description: Able to verbalize acceptance of life and situations over which he or she has no control  Outcome: Ongoing     Problem: Altered Mood, Depressive Behavior:  Goal: Able to verbalize and/or display a decrease in depressive symptoms  Description: Able to verbalize and/or display a decrease in depressive symptoms  12/9/2021 1527 by Maria M Piña RN  Outcome: Ongoing     Problem: Altered Mood, Depressive Behavior:  Goal: Ability to disclose and discuss suicidal ideas will improve  Description: Ability to disclose and discuss suicidal ideas will improve  Outcome: Ongoing     Problem: Altered Mood, Depressive Behavior:  Goal: Able to verbalize support systems  Description: Able to verbalize support systems  Outcome: Ongoing     Problem: Altered Mood, Depressive Behavior:  Goal: Absence of self-harm  Description: Absence of self-harm  12/9/2021 1527 by Maria M Piña RN  Outcome: Ongoing     Problem: Altered Mood, Depressive Behavior:  Goal: Patient specific goal  Description: Patient specific goal  Outcome: Ongoing     Problem: Altered Mood, Depressive Behavior:  Goal: Participates in care planning  Description: Participates in care planning  Outcome: Ongoing    Patient isolated to room and bed this shift. He did come out to the day room for meals. He had minimal participation in interview process, mostly kept the covers over his head when answering questions. He did deny SI/HI/AVH. He has not had any scheduled medications and did not require any PRN medications this shift. He has been absent of self harm and has not had any angry outburst this shift. He again refused to interact with staff. He did receive a phone call from family but refused to take it. He has not expressed any needs from staff at this time.

## 2021-12-09 NOTE — PLAN OF CARE
Problem: Altered Mood, Depressive Behavior:  Goal: Able to verbalize and/or display a decrease in depressive symptoms  Description: Able to verbalize and/or display a decrease in depressive symptoms  Outcome: Ongoing  Goal: Absence of self-harm  Description: Absence of self-harm  Outcome: Ongoing     Pt seclusive to room. Uncooperative and selectively mute during assessment. Pt did received PRN trazodone for sleep. Monitored for safety and comfort.

## 2021-12-09 NOTE — FLOWSHEET NOTE
Purposeful Rounding    Patient Location: Patient room    Patient willing to engage in conversation: No    Presentation/behavior: Withdrawn    Affect: Neutral/Euthymic(normal)    Concerns reported: none, pt had minimal interaction with writer    PRN medications given: none    Environmental assessment: Room free from clutter, Clear path to bathroom  and No safety hazards noted    Fall prevention interventions in place: Yellow non-skid socks on    Daily [place] Fall Risk Score: 59    Daily [place] Fall Risk Score: low      Electronically signed by Ashlyn Smith RN on 12/9/21 at 8:35 AM EST

## 2021-12-09 NOTE — PLAN OF CARE
Patient refused to converse with me when I entered the room to complete medical H&P. He was resting in bed. He opened his eyes, made eye contact with me but refused to speak to me. This is my second attempt. I see no acute medical issues on chart review.   Please call IM if patient becomes agreeable for medical H&P completion     Reyna Huber PA-C  12/9/2021 2:48 PM

## 2021-12-09 NOTE — FLOWSHEET NOTE
Purposeful Rounding    Patient Location: Patient room    Patient willing to engage in conversation: No    Presentation/behavior: Resistive and Other sleeping*    Affect: Flat/blunted    Concerns reported: none, pt covering head with blanket minimal interaction     PRN medications given: none    Environmental assessment: Room free from clutter, Clear path to bathroom  and No safety hazards noted    Fall prevention interventions in place: Yellow non-skid socks on    Daily [place] Fall Risk Score: 59    Daily [place] Fall Risk Score: low      Electronically signed by Catie Terrazas RN on 12/9/21 at 10:49 AM EST

## 2021-12-09 NOTE — FLOWSHEET NOTE
Purposeful Rounding    Patient Location: Patient room    Patient willing to engage in conversation: pt sleeping    Presentation/behavior: pt sleeping    Affect: pt sleeping    Concerns reported: NA pt sleeping    PRN medications given: trazodone at bedtime    Environmental assessment: Room free from clutter, Clear path to bathroom  and Adequate lighting    Fall prevention interventions in place: Lighting appropriate, Room free of clutter and Clear path to bathroom      Daily [place] Fall Risk Score: low      Electronically signed by Adria Cole RN on 12/9/21 at 2:28 AM EST

## 2021-12-09 NOTE — H&P
blood  pressure 96/52.  200 pounds. LABORATORIES:  Reviewed. Urine drug screen was positive for amphetamine  and cannabis. Alcohol none detected. SOCIAL HISTORY:  Unable to obtain from the patient where he lives. There is collateral information from nursing in [place]. Apparently,  he was brought in by mae. He is endorsing suicide and homicidal  ideations at the time in the ED. He denied any plan to hurt others and  stated that it would be \"spontaneous\" endorse any hallucinations. LEGAL ISSUES:  According to mae, family is concerned because he was  recently incarcerated and has not been taking care of himself. TRAUMA HISTORY:  Unable to obtain. MENTAL STATUS EXAMINATION:  The patient is a 29-year-old male who is in  bed, at times covered up face. He stated his age and other than  that would not provide any information as to why he was here, auditory  or visual hallucinations or suicidal or homicidal ideation. Insight and  judgment appears limited. Fund of knowledge and language, unable to  fully assess. Attention span and concentration unable to fully assess. Would not recall objects. Would not answer question regarding mood. Affect appeared constricted. Did not show any abnormal movements. DIAGNOSES:  AXIS I:  Based on prior history, major depressive episode, recurrent,  nonpsychotic, severe. AXIS II:  Rule out personality disorder, unspecified. AXIS III:  Unremarkable. AXIS IV:  Severe. AXIS V:  40. PLAN:  1. The patient is refusing to sign voluntary and was placed on a  72-hour hold at this time. 2.  We will hopefully obtain more information from the patient as he  hopefully becomes more cooperative with the interview process. 3.  Do not begin any psychotropics at this time. 4.  Anticipate that the patient will be uncooperative and requests  discharge. 5.  Estimated length of stay 2 to 3 days.     Spent approximately 70 minutes on this eval with more than 50% of the  time discussing patient care and treatment options.         David Witt MD    D: 12/08/2021 23:03:44       T: 12/08/2021 23:07:00     [email or web address]  Job#: [id number]     Doc#: [id number]    CC:

## 2021-12-09 NOTE — PROGRESS NOTES
Patient's step mother Ronalee Arlington called to speak with patient. Patient was informed that she was on the phone but refused to take the phone call at this time. Step-mother Tash Liu stated that she would try again tomorrow.

## 2021-12-10 PROCEDURE — 6370000000 HC RX 637 (ALT 250 FOR IP): Performed by: PSYCHIATRY & NEUROLOGY

## 2021-12-10 PROCEDURE — 1240000000 HC EMOTIONAL WELLNESS R&B

## 2021-12-10 PROCEDURE — 99233 SBSQ HOSP IP/OBS HIGH 50: CPT | Performed by: PSYCHIATRY & NEUROLOGY

## 2021-12-10 RX ORDER — DIPHENHYDRAMINE HCL 25 MG
50 TABLET ORAL EVERY 6 HOURS PRN
Status: DISCONTINUED | OUTPATIENT
Start: 2021-12-10 | End: 2021-12-11 | Stop reason: HOSPADM

## 2021-12-10 RX ORDER — LORAZEPAM 2 MG/1
2 TABLET ORAL EVERY 6 HOURS PRN
Status: DISCONTINUED | OUTPATIENT
Start: 2021-12-10 | End: 2021-12-11 | Stop reason: HOSPADM

## 2021-12-10 RX ORDER — RISPERIDONE 1 MG/1
1 TABLET, FILM COATED ORAL 2 TIMES DAILY
Status: DISCONTINUED | OUTPATIENT
Start: 2021-12-10 | End: 2021-12-11

## 2021-12-10 RX ORDER — HALOPERIDOL 5 MG/ML
10 INJECTION INTRAMUSCULAR EVERY 6 HOURS PRN
Status: DISCONTINUED | OUTPATIENT
Start: 2021-12-10 | End: 2021-12-11 | Stop reason: HOSPADM

## 2021-12-10 RX ORDER — DIPHENHYDRAMINE HYDROCHLORIDE 50 MG/ML
50 INJECTION INTRAMUSCULAR; INTRAVENOUS EVERY 6 HOURS PRN
Status: DISCONTINUED | OUTPATIENT
Start: 2021-12-10 | End: 2021-12-11 | Stop reason: HOSPADM

## 2021-12-10 RX ORDER — LORAZEPAM 2 MG/ML
2 INJECTION INTRAMUSCULAR EVERY 6 HOURS PRN
Status: DISCONTINUED | OUTPATIENT
Start: 2021-12-10 | End: 2021-12-11 | Stop reason: HOSPADM

## 2021-12-10 RX ORDER — HALOPERIDOL 10 MG/1
10 TABLET ORAL EVERY 6 HOURS PRN
Status: DISCONTINUED | OUTPATIENT
Start: 2021-12-10 | End: 2021-12-11 | Stop reason: HOSPADM

## 2021-12-10 RX ADMIN — NICOTINE POLACRILEX 2 MG: 2 GUM, CHEWING BUCCAL at 21:30

## 2021-12-10 RX ADMIN — OLANZAPINE 10 MG: 10 TABLET, FILM COATED ORAL at 01:25

## 2021-12-10 RX ADMIN — TRAZODONE HYDROCHLORIDE 50 MG: 50 TABLET ORAL at 01:25

## 2021-12-10 RX ADMIN — RISPERIDONE 1 MG: 1 TABLET ORAL at 12:25

## 2021-12-10 ASSESSMENT — PAIN SCALES - GENERAL: PAINLEVEL_OUTOF10: 0

## 2021-12-10 NOTE — PROGRESS NOTES
Patient refused to have blood draw. Patient stated, \"I had blood drawn this morning, I don't want to do it again. \"  Patient educated that he did not have labs drawn, patient again refused.

## 2021-12-10 NOTE — CARE COORDINATION
[facility]  Treatment Team Note  Day 3    Review Date & Time: 0900  12/10/2021    Patient was not in treatment team      Status EXAM:   Status and Exam  Normal: No  Facial Expression: Avoids Gaze, Flat, Expressionless  Affect: Blunt  Level of Consciousness: Somnolent  Mood:Normal: No  Mood: Irritable  Motor Activity:Normal: No  Motor Activity: Decreased  Interview Behavior: Uncooperative/Withdrawn  Preception: Others(See Comment) (RAZ pt refused assessment)  Attention:Normal:  (RAZ pt refused assessment)  Attention: Others(See comment) (RAZ pt refused assessment)  Thought Processes: Circumstantial  Thought Content:Normal:  (RAZ pt refused assessment)  Thought Content: Other(See Comment) (pt ref assessment)  Hallucinations: Unable to assess (RAZ pt refused assessment)  Delusions:  (RAZ pt refused assessment)  Memory:Normal:  (RAZ pt refused assessment)  Insight and Judgment: No  Insight and Judgment: Poor Judgment, Poor Insight  Present Suicidal Ideation: No  Present Homicidal Ideation: No      Suicide Risk CSSR-S:  1) Within the past month, have you wished you were dead or wished you could go to sleep and not wake up? : Yes  2) Have you actually had any thoughts of killing yourself? : Yes  3) Have you been thinking about how you might kill yourself? : Yes  5) Have you started to work out or worked out the details of how to kill yourself? Do you intend to carry out this plan? : Yes  6) Have you ever done anything, started to do anything, or prepared to do anything to end your life?: Yes      PLAN/TREATMENT RECOMMENDATIONS UPDATE: Patient will take medication as prescribed, eat 75% of meals, attend groups, participate in milieu activities, participate in treatment team and care planning for discharge and follow up.       Jem Hutchins RN

## 2021-12-10 NOTE — FLOWSHEET NOTE
Purposeful Rounding    Patient Location: Patient room    Patient willing to engage in conversation: No    Presentation/behavior: Other resting*    Affect: Neutral/Euthymic(normal)    Concerns reported: pt noted to be resting, eyes closed, respirations even and easy    PRN medications given: none    Environmental assessment: Room free from clutter, Clear path to bathroom  and No safety hazards noted    Fall prevention interventions in place: Yellow non-skid socks on    Daily [place] Fall Risk Score: 59    Daily [place] Fall Risk Score: low      Electronically signed by Kirstin Baldwin RN on 12/10/21 at 10:55 AM EST

## 2021-12-10 NOTE — PROGRESS NOTES
Department of Psychiatry  AttendingProgress Note  Chief Complaint: depression  Roderick Luna continues to be resistant to talking. He muttered a few words today that were unintelligible. He stacked both mattresses on  His bed to slepp. He is not engaged but not asking to leave and is not disruptive. I am concerned that there may be an underlying psychotic process. Will begin a trial with Risperdal 1 mg BID  Patient's chart was reviewed and collaborated with  about the treatment plan. SUBJECTIVE:    Patient is feeling unchanged. Suicidal ideation:  RAZ. Patient RAZ medication side effects. ROS: Patient has new complaints: no  Sleeping adequately:  Yes   Appetite adequate: Attending groups: No:   Visitors:No    OBJECTIVE    Physical  VITALS:  BP 95/65   Pulse 76   Temp 98.2 °F (36.8 °C) (Temporal)   Resp 14   Ht 5' 9\" (1.753 m) Comment: Per chart review  Wt 200 lb (90.7 kg) Comment: per chart review  SpO2 94%   BMI 29.53 kg/m²     Mental Status Examination:  Patients appearance was ill-appearing. Thoughts are RAZ. Homicidal ideations RAZ. No abnormal movements, tics or mannerisms. Memory RAZ [place] 0. Concentration Unable to Assess. Alert and oriented X 4. Insight and Judgement impaired insight. Patient was uncooperative. Patient gait normal. Mood constricted, affect flat affect Hallucinations RAZ, suicidal ideations no specific plan to harm self Speech RAZ  Data  Labs:   No visits with results within 2 Day(s) from this visit.    Latest known visit with results is:   Admission on 12/07/2021, Discharged on 12/07/2021   Component Date Value Ref Range Status    Ventricular Rate 12/07/2021 69  BPM Final    Atrial Rate 12/07/2021 69  BPM Final    P-R Interval 12/07/2021 154  ms Final    QRS Duration 12/07/2021 92  ms Final    Q-T Interval 12/07/2021 376  ms Final    QTc Calculation (Bazett) 12/07/2021 402  ms Final    P Axis 12/07/2021 42  degrees Final    R Axis 12/07/2021 60  degrees Final  T Axis 12/07/2021 39  degrees Final    Diagnosis 12/07/2021 Sinus rhythm with marked sinus arrhythmiaST elevation, consider early repolarization, pericarditis, or injuryT wave abnormality, consider anterior ischemiaConfirmed by Amie Saucedo ([id number]) on 12/8/2021 12:21:54 PM   Final    WBC 12/07/2021 11.5* 4.0 - 11.0 K/uL Final    RBC 12/07/2021 5.95* 4.20 - 5.90 M/uL Final    Hemoglobin 12/07/2021 14.2  13.5 - 17.5 g/dL Final    Hematocrit 12/07/2021 44.2  40.5 - 52.5 % Final    MCV 12/07/2021 74.3* 80.0 - 100.0 fL Final    MCH 12/07/2021 23.9* 26.0 - 34.0 pg Final    MCHC 12/07/2021 32.2  31.0 - 36.0 g/dL Final    RDW 12/07/2021 16.9* 12.4 - 15.4 % Final    Platelets 98/13/0896 317  135 - 450 K/uL Final    MPV 12/07/2021 8.4  5.0 - 10.5 fL Final    Neutrophils % 12/07/2021 67.6  % Final    Lymphocytes % 12/07/2021 21.7  % Final    Monocytes % 12/07/2021 9.3  % Final    Eosinophils % 12/07/2021 1.1  % Final    Basophils % 12/07/2021 0.3  % Final    Neutrophils Absolute 12/07/2021 7.8* 1.7 - 7.7 K/uL Final    Lymphocytes Absolute 12/07/2021 2.5  1.0 - 5.1 K/uL Final    Monocytes Absolute 12/07/2021 1.1  0.0 - 1.3 K/uL Final    Eosinophils Absolute 12/07/2021 0.1  0.0 - 0.6 K/uL Final    Basophils Absolute 12/07/2021 0.0  0.0 - 0.2 K/uL Final    Sodium 12/07/2021 140  136 - 145 mmol/L Final    Potassium reflex Magnesium 12/07/2021 3.5  3.5 - 5.1 mmol/L Final    Chloride 12/07/2021 102  99 - 110 mmol/L Final    CO2 12/07/2021 23  21 - 32 mmol/L Final    Anion Gap 12/07/2021 15  3 - 16 Final    Glucose 12/07/2021 90  70 - 99 mg/dL Final    BUN 12/07/2021 9  7 - 20 mg/dL Final    CREATININE 12/07/2021 1.1  0.9 - 1.3 mg/dL Final    GFR Non- 12/07/2021 >60  >60 Final    Comment: >60 mL/min/1.73m2 EGFR, calc. for ages 25 and older using the  MDRD formula (not corrected for weight), is valid for stable  renal function.       GFR  12/07/2021 >60  >60 Final may not be detected by this Methodology. Pain management screen  panel  Drug panel-PM-Hi Res Ur, Interp (PAIN) should be considered for drug  monitoring \".  PCP Screen, Urine 12/07/2021 Neg  Negative <25 ng/mL Final    Methadone Screen, Urine 12/07/2021 Neg  Negative <300 ng/mL Final    Propoxyphene Scrn, Ur 12/07/2021 Neg  Negative <300 ng/mL Final    Oxycodone Urine 12/07/2021 Neg  Negative <100 ng/ml Final    pH, UA 12/07/2021 6.0   Final    Comment: Urine pH less than 5.0 or greater than 8.0 may indicate sample adulteration. Another sample should be collected if clinically  indicated.  Drug Screen Comment: 12/07/2021 see below   Final    Comment: This method is a screening test to detect only these drug  classes as part of a medical workup. Confirmatory testing  by another method should be ordered if clinically indicated.  Ethanol Lvl 12/07/2021 None Detected  mg/dL Final    Comment:    None Detected  Conversion factor:  100 mg/dl = .100 g/dl  For Medical Purposes Only      Acetaminophen Level 12/07/2021 <5* 10 - 30 ug/mL Final    Comment: Therapeutic Range: 10.0-30.0 ug/mL  Toxic: >=916 ug/mL      Salicylate, Serum 64/14/6576 <0.3* 15.0 - 30.0 mg/dL Final    Comment: Therapeutic Range: 15.0-30.0 mg/dL  Toxic: >30.0 mg/dL      SARS-CoV-2, NAAT 12/07/2021 Not Detected  Not Detected Final    Comment: Rapid NAAT:   Negative results should be treated as presumptive and,  if inconsistent with clinical signs and symptoms or necessary for  patient management, should be tested with an alternative molecular  assay. Negative results do not preclude SARS-CoV-2 infection and  should not be used as the sole basis for patient management decisions. This test has been authorized by the FDA under an Emergency Use  Authorization (EUA) for use by authorized laboratories.     Fact sheet for Healthcare Providers:  Gilda  Fact sheet for Patients: SeekCultures.si    METHODOLOGY: Isothermal Nucleic Acid Amplification      Total Protein 12/07/2021 7.0  6.4 - 8.2 g/dL Final    Albumin 12/07/2021 4.4  3.4 - 5.0 g/dL Final    Alkaline Phosphatase 12/07/2021 67  40 - 129 U/L Final    ALT 12/07/2021 17  10 - 40 U/L Final    AST 12/07/2021 24  15 - 37 U/L Final    Total Bilirubin 12/07/2021 0.3  0.0 - 1.0 mg/dL Final    Bilirubin, Direct 12/07/2021 <0.2  0.0 - 0.3 mg/dL Final    Bilirubin, Indirect 12/07/2021 see below  0.0 - 1.0 mg/dL Final    Comment: Indirect Bilirubin cannot be calculated since Total Bilirubin  and/or Direct Bilirubin is below measurable range.  Magnesium 12/07/2021 2.40  1.80 - 2.40 mg/dL Final            Medications  Current Facility-Administered Medications: magnesium hydroxide (MILK OF MAGNESIA) 400 MG/5ML suspension 30 mL, 30 mL, Oral, Daily PRN  aluminum & magnesium hydroxide-simethicone (MAALOX) 200-200-20 MG/5ML suspension 30 mL, 30 mL, Oral, Q6H PRN  OLANZapine (ZYPREXA) tablet 10 mg, 10 mg, Oral, Q8H PRN **OR** OLANZapine (ZYPREXA) injection 10 mg, 10 mg, IntraMUSCular, Q8H PRN  sterile water injection 2.1 mL, 2.1 mL, IntraMUSCular, Q4H PRN  benztropine mesylate (COGENTIN) injection 2 mg, 2 mg, IntraMUSCular, BID PRN  nicotine polacrilex (COMMIT) lozenge 2 mg, 2 mg, Oral, Q1H PRN  acetaminophen (TYLENOL) tablet 650 mg, 650 mg, Oral, Q4H PRN  ibuprofen (ADVIL;MOTRIN) tablet 800 mg, 800 mg, Oral, Q8H PRN  hydrOXYzine (VISTARIL) capsule 50 mg, 50 mg, Oral, Q6H PRN  traZODone (DESYREL) tablet 50 mg, 50 mg, Oral, Nightly PRN    ASSESSMENT AND PLAN    Principal Problem:    Depression, unspecified  Active Problems:    Major depression, recurrent ([facility] [address]  Resolved Problems:    * No resolved hospital problems. *       1. Patient s symptoms   show no change  2. Probable discharge is next week  3. Discharge planning is incomplete  4. Suicidal ideation is RAZ  5.  Total time with patient was 40 minutes and more than 50 % of that time was spent counseling the patient on their symptoms, treatment and expected goals.

## 2021-12-10 NOTE — PLAN OF CARE
Problem: Altered Mood, Depressive Behavior:  Goal: Able to verbalize acceptance of life and situations over which he or she has no control  Description: Able to verbalize acceptance of life and situations over which he or she has no control  Outcome: Ongoing     Problem: Altered Mood, Depressive Behavior:  Goal: Able to verbalize and/or display a decrease in depressive symptoms  Description: Able to verbalize and/or display a decrease in depressive symptoms  Outcome: Ongoing     Problem: Altered Mood, Depressive Behavior:  Goal: Ability to disclose and discuss suicidal ideas will improve  Description: Ability to disclose and discuss suicidal ideas will improve  Outcome: Ongoing     Problem: Altered Mood, Depressive Behavior:  Goal: Able to verbalize support systems  Description: Able to verbalize support systems  Outcome: Ongoing     Problem: Altered Mood, Depressive Behavior:  Goal: Absence of self-harm  Description: Absence of self-harm  12/10/2021 1315 by Cathryn Moe RN  Outcome: Ongoing     Problem: Altered Mood, Depressive Behavior:  Goal: Patient specific goal  Description: Patient specific goal  Outcome: Ongoing     Problem: Altered Mood, Depressive Behavior:  Goal: Participates in care planning  Description: Participates in care planning  Outcome: Ongoing     Problem: Risk of Harm:  Goal: Ability to remain free from injury will improve  Description: Ability to remain free from injury will improve  Outcome: Ongoing     Problem: Suicide risk  Goal: Provide patient with safe environment  Description: Provide patient with safe environment  Outcome: Ongoing     Patient was visible on unit. He mostly isolates to self and room, noted to be lying in bed with covers over his head. Did come out to the day room for meals. He did not attend any groups. He has been absent of self harm and free from injury. He did start scheduled medications this shift and was cooperative with taking them.   He continues to be Would prefer to have her evaluated in office. evasive during interview, with minimal interaction with staff only answering yes and no. He does denies SI/HI/AVH. When asked if he wanted to talk with his step-mother  today he stated, [facility] tomorrow. \"  He had good nutrition intake, and requested extra portions or dinner this evening.

## 2021-12-10 NOTE — FLOWSHEET NOTE
Purposeful Rounding    Patient Location: Patient room    Patient willing to engage in conversation: Yes    Presentation/behavior: Withdrawn    Affect: Flat/blunted    Concerns reported: none, pt did answer few questions.     PRN medications given: none    Environmental assessment: Room free from clutter, Clear path to bathroom  and No safety hazards noted    Fall prevention interventions in place: Yellow non-skid socks on    Daily [place] Fall Risk Score: 59    Daily [place] Fall Risk Score: low      Electronically signed by Digna Ugalde RN on 12/10/21 at 8:56 AM EST

## 2021-12-10 NOTE — BH NOTE
Pt received PRN Zyprexa and Trazodone at 0125. Pt had bizarre behaviors, uncooperative with staff, and poor sleep. Medication effective, pt slept throughout remainder of night.

## 2021-12-10 NOTE — PLAN OF CARE
Patient up ad nikita. Uncooperative. Pt has had bizarre behaviors this shift. Laughing at inappropriate moments. Stacking his mattresses on top of each other, switching from bed to bed. Pt requesting cigarettes and dip, declining nicotine patch/gum/lozenge. Pt requesting things from staff, getting in their personal space. Slightly redirectable. Pt given zyprexa and trazodone and pt slept throughout remainder of night.       Problem: Altered Mood, Depressive Behavior:  Goal: Absence of self-harm  Description: Absence of self-harm  Outcome: Ongoing

## 2021-12-10 NOTE — FLOWSHEET NOTE
Purposeful Rounding    Patient Location: Day room    Patient willing to engage in conversation: Yes    Presentation/behavior: Resistive    Affect: Flat/blunted    Concerns reported: none, pt eating in day room, was approachable, discussed starting new medication patient agreeable. Also discussed patient calling his step-mother but he stated, not today.     PRN medications given: none    Environmental assessment: Room free from clutter, Clear path to bathroom  and No safety hazards noted    Fall prevention interventions in place: Yellow non-skid socks on    Daily [place] Fall Risk Score: 59  Daily [place] Fall Risk Score: none      Electronically signed by Cole Wilson RN on 12/10/21 at 12:46 PM EST

## 2021-12-10 NOTE — FLOWSHEET NOTE
Purposeful Rounding    Patient Location: Patient room    Patient willing to engage in conversation: No    Presentation/behavior: Withdrawn    Affect: Flat/blunted and irritable    Concerns reported: none reported    PRN medications given: none    Environmental assessment: Room free from clutter, Clear path to bathroom , Adequate lighting and No safety hazards noted    Fall prevention interventions in place: Lighting appropriate, Room free of clutter and Clear path to bathroom    Daily [place] Fall Risk Score: low      Electronically signed by Nathan Valdez RN on 12/9/21 at 8:04 PM EST

## 2021-12-10 NOTE — FLOWSHEET NOTE
Purposeful Rounding    Patient Location: Patient room    Patient willing to engage in conversation: No    Presentation/behavior: Withdrawn    Affect: irritable    Concerns reported: none    PRN medications given: none    Environmental assessment: Room free from clutter, Clear path to bathroom , Adequate lighting and No safety hazards noted    Fall prevention interventions in place: Lighting appropriate, Room free of clutter and Clear path to bathroom     Daily [place] Fall Risk Score: low      Electronically signed by Adán Amado RN on 12/10/21 at 12:35 AM EST

## 2021-12-10 NOTE — FLOWSHEET NOTE
Purposeful Rounding    Patient Location: Patient room    Patient willing to engage in conversation: No    Presentation/behavior: Other resting*    Affect: Neutral/Euthymic(normal)    Concerns reported: pt noted to be resting, eyes closed, respirations even and easy    PRN medications given: none    Environmental assessment: Room free from clutter, Clear path to bathroom  and No safety hazards noted    Fall prevention interventions in place: Yellow non-skid socks on    Daily [place] Fall Risk Score: 59    Daily [place] Fall Risk Score: low      Electronically signed by Salma Rothman RN on 12/10/21 at 4:29 PM EST

## 2021-12-10 NOTE — FLOWSHEET NOTE
Purposeful Rounding    Patient Location: Patient room    Patient willing to engage in conversation: No    Presentation/behavior: Withdrawn    Affect: Irritable    Concerns reported: none    PRN medications given: none    Environmental assessment: Room free from clutter, Clear path to bathroom , Adequate lighting and No safety hazards noted    Fall prevention interventions in place: Lighting appropriate, Room free of clutter and Clear path to bathroom      Daily [place] Fall Risk Score: low      Electronically signed by Yasir Marques RN on 12/9/21 at 10:33 PM EST

## 2021-12-10 NOTE — BH NOTE
Asher Leventhal was observed to have moved the mattress from his bed to the empty bed in his room and was laying on top of both mattresses. This matter was discussed with Asher Leventhal and he was asked to move the mattress back to his bed, which he did. Asher Leventhal took a shower, got a clean gown and then lay down on the bed which was not assigned to him. A few minutes later Asher Leventhal moved the mattress back to where it was on top of the mattress of the empty bed. He is currently laying on both mattresses.

## 2021-12-10 NOTE — FLOWSHEET NOTE
Purposeful Rounding    Patient Location: Patient room    Patient willing to engage in conversation: No    Presentation/behavior: Other resting*    Affect: Flat/blunted    Concerns reported: pt resting, eyes closed, respirations even and easy, did respond when asked if he needed anything, pt stated, \"no\"     PRN medications given: none    Environmental assessment: Room free from clutter, Clear path to bathroom  and No safety hazards noted    Fall prevention interventions in place: Yellow non-skid socks on    Daily [place] Fall Risk Score: 59  Daily [place] Fall Risk Score: low      Electronically signed by Catie Terrazas RN on 12/10/21 at 3:16 PM EST

## 2021-12-11 VITALS
WEIGHT: 200 LBS | OXYGEN SATURATION: 94 % | RESPIRATION RATE: 16 BRPM | TEMPERATURE: 97.7 F | DIASTOLIC BLOOD PRESSURE: 57 MMHG | HEART RATE: 88 BPM | HEIGHT: 69 IN | SYSTOLIC BLOOD PRESSURE: 107 MMHG | BODY MASS INDEX: 29.62 KG/M2

## 2021-12-11 PROCEDURE — 99239 HOSP IP/OBS DSCHRG MGMT >30: CPT | Performed by: PSYCHIATRY & NEUROLOGY

## 2021-12-11 PROCEDURE — 5130000000 HC BRIDGE APPOINTMENT

## 2021-12-11 NOTE — PROGRESS NOTES
Several loud banging noise heard from patient's room, bathroom area. Upon entering patient was laying in his bed, his chair was noted to be in the bathroom. Code violet called. Patient agitated, stated the top part of the shower would not work. Staff removed chair, patient unwilling to talk with staff. Stated, \"Leave me alone\",  Corley pulled up over patients head. Dr. Paloma Preston notified, given verbal orders for PO and IM Bendadryl 50 mg , Haldol 10mg, and Ativan 2mg. Shower was reset, and patient was informed. Patient was offered medication, patient refused to talk and did not take any medications at this time. All chairs and bedside table removed from patients room. His lights turned off to decrease stimulation. Patient noted to be lying in bed at this time.

## 2021-12-11 NOTE — BH NOTE
Pt refusing to review discharge instructions and refused to complete safety plan. Pt refusing to call for a ride or call for a cab. Pt would not tell staff where he lives, only states, \"I'll walk\"  and writer walked pt out at this time.

## 2021-12-11 NOTE — BH NOTE
Purposeful Rounding    Patient Location: Day room    Patient willing to engage in conversation: No    Presentation/behavior: Guarded/Suspicious and Withdrawn    Affect: Flat/blunted and Constricted    Concerns reported: would not answer    PRN medications given: none at this time    Environmental assessment: No safety hazards noted    Fall prevention interventions in place: none required  Daily [place] Fall Risk Score: 59    Daily [place] Fall Risk Score: low      Electronically signed by Charity Diego LPN on 18/75/00 at 2:33 AM EST

## 2021-12-11 NOTE — BH NOTE
Patient is refusing his HS risperidone. He requested nicotine gum and has an order for lozenge. When he was informed of his order for the lozenge he became agitated and said, \"I've got nothing that I've wanted in here. I just want to be left alone. \"  Patient did disclose that he felt like he was going through both nicotine and Ice withdrawal.  I asked him if he had disclosed this to the doctor and he stated no. Writer phoned Dr. Ivett Wiley and informed him of conversation including ICE withdrawal, with permission to change nicotine lozenge to gum, but no further orders given at this time.

## 2021-12-11 NOTE — BH NOTE
[place] loud banging coming from room. Noted pt swinging a heavy chair at the window. \"I want to go out\". Provider aware. Discharge order obtained. Refusing meds and instructions. Security called to discharge pt.

## 2021-12-11 NOTE — PLAN OF CARE
Problem: Altered Mood, Depressive Behavior:  Goal: Able to verbalize and/or display a decrease in depressive symptoms  Description: Able to verbalize and/or display a decrease in depressive symptoms  12/10/2021 2241 by Janus Hodgkins, RN  Outcome: Ongoing   Patient has been withdrawn to room. He did come out for a snack and to get items to shower. Upon approach he is evasive with interview and tells writer \"I just want to be left alone\". He decline to answer any interview questions about feelings of suicidality/homicide or hallucinations. He refused HS risperidone but did take some nicotine gum.

## 2021-12-11 NOTE — PROGRESS NOTES
Approached patient as he was sitting in the day room watching TV. Patient agreeable to talk. Patient stated he was unsure of taking the Risperdal because of the side effects. He stated he has taken many medications before and had side effects to most.  He could not tell me which medications those were. He did state the Trazodone did help him sleep last evening. Patient stated, \"I do not trust anyone, everyone is out to get out. \"  When asked if he had any psych history patient stated, \"I am just anti-social\". 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 Patient was educated that he could not use a pencil but we did have safety pens, markers, or pastels. Patient stated, \"I can really draw with any of those. \"  He was offered a snack and was thankful. He returned to his room after watching TV for about 20 minutes.

## 2021-12-11 NOTE — PROGRESS NOTES
[facility]  Discharge Note    Pt discharged with followings belongings:   Dental Appliances: Other (Comment)  Vision - Corrective Lenses: None  Hearing Aid: None  Jewelry: None  Body Piercings Removed: N/A  Clothing: Other (Comment)  Other Valuables: Pocket Knife   Belongings returned to patient. Patient education on aftercare instructions: yes  Information faxed to [facility] by Donna Jimenez LPN  at 86:06 AM .Patient verbalize understanding of AVS:  N/A-refused to sign AVS or complete a safety plan. Phone numbers provided to [facility] behavioral.  Status EXAM upon discharge:  Status and Exam  Normal: No  Facial Expression: Avoids Gaze, Flat, Hostile  Affect: Blunt  Level of Consciousness: Somnolent  Mood:Normal: No  Mood: Irritable, Angry (evasive)  Motor Activity:Normal: No  Motor Activity: Decreased (except when throwing furniture in room)  Interview Behavior: Uncooperative/Withdrawn, Irritable  Preception:  (refused interview)  Attention:Normal: No  Attention: Distractible, Unable to Concentrate  Thought Processes: Circumstantial  Thought Content:Normal:  (refused interview)  Thought Content:  (elder- refused interview)  Hallucinations: Unable to assess (refused interview)  Delusions:  (ELDER pt refused assessment)  Memory:Normal:  (ELDER pt refused assessment)  Insight and Judgment: No  Insight and Judgment: Poor Judgment, Poor Insight, Unmotivated  Present Suicidal Ideation: Other(See comment) (elder, refused interview)  Present Homicidal Ideation: Other(See comment) (elder, refused interview)      Metabolic Screening:    No results found for: LABA1C    No results found for: CHOL  No results found for: TRIG  No results found for: HDL  No components found for: LDLCAL  No results found for: Corina Rand 12 Appointment completed: Reviewed Discharge Instructions with patient. Patient verbalizes understanding and agreement with the discharge plan using the teachback method.      Referral for Outpatient Tobacco Cessation Counseling, upon discharge (kristin X if applicable and completed):    ( )  Hospital staff assisted patient to call Quit Line or faxed referral                                   during hospitalization                  ( )  Recognizing danger situations (included triggers and roadblocks), if not completed on admission                    ( )  Coping skills (new ways to manage stress, exercise, relaxation techniques, changing routine, distraction), if not completed on admission                                                           ( )  Basic information about quitting (benefits of quitting, techniques in how to quit, available resources, if not completed on admission  ( ) Referral for counseling faxed to [facility]   ( ) Patient refused referral  ( x) Patient refused counseling  ( ) Patient refused smoking cessation medication upon discharge    Vaccinations (kristin X if applicable and completed):  ( ) Patient states already received influenza vaccine elsewhere  ( ) Patient received influenza vaccine during this hospitalization  (x ) Patient refused influenza vaccine at this time

## 2021-12-14 NOTE — DISCHARGE SUMMARY
Discharge Summary   Admit Date: 12/7/2021   Discharge Date:  12/11/2021    Condition at dc fair  Spent over 40 minutes with patient and staff on [facility] with more than 50 % of time spent with patient discussing care  Final Dx: axis I: Depression, unspecified   Axis 2: Antisocial Personality  Disorder primary  Rita 3: See Medical History    And Present on Admission:   Depression, unspecified   Major depression, recurrent ([address]     Axis 4: Problems related to the social environment  Axis 5:  On Admission: 31-40 impairment in reality testing At Discharge: 51-60 moderate symptoms   All conditions on Axis 1 and Axis 2 and active problems on Axis 3 were treated while patient was hospitalized. [facility] Problems    Diagnosis Date Noted    Major depression, recurrent ([address] [F33.9] 12/08/2021    Depression, unspecified [F32.A] 12/07/2021   )   Condition on DC  Mood and affect are stable and pt is not suicidal   VITALS:  BP (!) 107/57   Pulse 88   Temp 97.7 °F (36.5 °C) (Temporal)   Resp 16   Ht 5' 9\" (1.753 m) Comment: Per chart review  Wt 200 lb (90.7 kg) Comment: per chart review  SpO2 94%   BMI 29.53 kg/m²   Brief Summary Present Illness   CHIEF COMPLAINT:  Suicidal ideation.     HISTORY OF PRESENT ILLNESS:  Most of the patient's history is obtained  from collateral and recent documentation as he was uncooperative with me  today. When I asked him his age, he said 32. When I asked him why he  was here, he said \"I do not know. \"   When I asked him where he lived, he  said \"I do not know. \"  He made no effort to engage in the interview  process.     From prior history, he was brought in by family for depression which  apparently has gotten worse over the past week or so. He has had  thoughts of suicide stating he is going to hang himself. There were no  clear precipitants for his behaviors and thoughts.   Apparently, there is  no history of suicide threats or depression in the past.     He apparently has been drinking alcohol recently but denies other drug  use. Prior eval in the ED was that he was drowsy and had to be  repeatedly awakened to answer questions  Hospital Course  Patient did not  stabilized on unit and did not participate in  milieu treatment. 12/9 Eddie Vargas has been noncompliant with treatment. He stays in bed with head covered and refuses to engage. Will continue to attempt contact. Remains on a 72 hour hold    12/10 Kimberly Carroll continues to be resistant to talking. He muttered a few words today that were unintelligible. He stacked both mattresses on  His bed to slepp. He is not engaged but not asking to leave and is not disruptive. I am concerned that there may be an underlying psychotic process. 12/10 nursing report  Patient is refusing his HS risperidone. He requested nicotine gum and has an order for lozenge. When he was informed of his order for the lozenge he became agitated and said, \"I've got nothing that I've wanted in here. I just want to be left alone. \"  Patient did disclose that he felt like he was going through both nicotine and Ice withdrawal.  I asked him if he had disclosed this to the doctor and he stated no. Writer phoned Dr. Violet Montero and informed him of conversation including ICE withdrawal, with permission to change nicotine lozenge to gum, but no further orders given at this time. 12/11 Moody loud banging coming from room. Noted pt swinging a heavy chair at the window. \"I want to go out\". Provider aware. Discharge order obtained. Refusing meds and instructions. Security called to discharge pt          Patient was discharged to home to continue recovery in the community. PE: (reviewed) and labs (see medical H&PE)  Labs:    No visits with results within 2 Day(s) from this visit.    Latest known visit with results is:   Admission on 12/07/2021, Discharged on 12/07/2021   Component Date Value Ref Range Status    Ventricular Rate 12/07/2021 69  BPM Final    Atrial Rate 12/07/2021 69  BPM Final    P-R Interval 12/07/2021 154  ms Final    QRS Duration 12/07/2021 92  ms Final    Q-T Interval 12/07/2021 376  ms Final    QTc Calculation (Bazett) 12/07/2021 402  ms Final    P Axis 12/07/2021 42  degrees Final    R Axis 12/07/2021 60  degrees Final    T Axis 12/07/2021 39  degrees Final    Diagnosis 12/07/2021 Sinus rhythm with marked sinus arrhythmiaST elevation, consider early repolarization, pericarditis, or injuryT wave abnormality, consider anterior ischemiaConfirmed by Scott Lancaster ([id number]) on 12/8/2021 12:21:54 PM   Final    WBC 12/07/2021 11.5* 4.0 - 11.0 K/uL Final    RBC 12/07/2021 5.95* 4.20 - 5.90 M/uL Final    Hemoglobin 12/07/2021 14.2  13.5 - 17.5 g/dL Final    Hematocrit 12/07/2021 44.2  40.5 - 52.5 % Final    MCV 12/07/2021 74.3* 80.0 - 100.0 fL Final    MCH 12/07/2021 23.9* 26.0 - 34.0 pg Final    MCHC 12/07/2021 32.2  31.0 - 36.0 g/dL Final    RDW 12/07/2021 16.9* 12.4 - 15.4 % Final    Platelets 21/89/0778 317  135 - 450 K/uL Final    MPV 12/07/2021 8.4  5.0 - 10.5 fL Final    Neutrophils % 12/07/2021 67.6  % Final    Lymphocytes % 12/07/2021 21.7  % Final    Monocytes % 12/07/2021 9.3  % Final    Eosinophils % 12/07/2021 1.1  % Final    Basophils % 12/07/2021 0.3  % Final    Neutrophils Absolute 12/07/2021 7.8* 1.7 - 7.7 K/uL Final    Lymphocytes Absolute 12/07/2021 2.5  1.0 - 5.1 K/uL Final    Monocytes Absolute 12/07/2021 1.1  0.0 - 1.3 K/uL Final    Eosinophils Absolute 12/07/2021 0.1  0.0 - 0.6 K/uL Final    Basophils Absolute 12/07/2021 0.0  0.0 - 0.2 K/uL Final    Sodium 12/07/2021 140  136 - 145 mmol/L Final    Potassium reflex Magnesium 12/07/2021 3.5  3.5 - 5.1 mmol/L Final    Chloride 12/07/2021 102  99 - 110 mmol/L Final    CO2 12/07/2021 23  21 - 32 mmol/L Final    Anion Gap 12/07/2021 15  3 - 16 Final    Glucose 12/07/2021 90  70 - 99 mg/dL Final    BUN 12/07/2021 9  7 - 20 mg/dL Final    CREATININE 12/07/2021 1.1  0.9 - 1.3 mg/dL Final    GFR Non- 12/07/2021 >60  >60 Final    Comment: >60 mL/min/1.73m2 EGFR, calc. for ages 25 and older using the  MDRD formula (not corrected for weight), is valid for stable  renal function.  GFR  12/07/2021 >60  >60 Final    Comment: Chronic Kidney Disease: less than 60 ml/min/1.73 sq.m. Kidney Failure: less than 15 ml/min/1.73 sq.m. Results valid for patients 18 years and older.  Calcium 12/07/2021 9.5  8.3 - 10.6 mg/dL Final    Troponin 12/07/2021 <0.01  <0.01 ng/mL Final    Methodology by Troponin T    Color, UA 12/07/2021 YELLOW  Straw/Yellow Final    Clarity, UA 12/07/2021 Clear  Clear Final    Glucose, Ur 12/07/2021 Negative  Negative mg/dL Final    Bilirubin Urine 12/07/2021 Negative  Negative Final    Ketones, Urine 12/07/2021 TRACE* Negative mg/dL Final    Specific Gravity, UA 12/07/2021 1.025  1.005 - 1.030 Final    Blood, Urine 12/07/2021 Negative  Negative Final    pH, UA 12/07/2021 6.0  5.0 - 8.0 Final    Protein, UA 12/07/2021 Negative  Negative mg/dL Final    Urobilinogen, Urine 12/07/2021 1.0  <2.0 E.U./dL Final    Nitrite, Urine 12/07/2021 Negative  Negative Final    Leukocyte Esterase, Urine 12/07/2021 Negative  Negative Final    Microscopic Examination 12/07/2021 Not Indicated   Final    Urine Type 12/07/2021 NotGiven   Final    Urine received in a container without preservatives.  Urine Reflex to Culture 12/07/2021 Not Indicated   Final    Amphetamine Screen, Urine 12/07/2021 POSITIVE* Negative <1000ng/mL Final    Comment: High concentrations of ephedrine/pseudoephedrine or  phenylpropanolamine may cause false positive results  for amphetamine. Therefore, confirmatory testing for  amphetamine should be considered if clinically indicated.       Barbiturate Screen, Ur 12/07/2021 Neg  Negative <200 ng/mL Final    Benzodiazepine Screen, Urine 12/07/2021 Neg  Negative <200 ng/mL Final    Cannabinoid Scrn, Ur 12/07/2021 POSITIVE* Negative <50 ng/mL Final    Cocaine Metabolite Screen, Urine 12/07/2021 Neg  Negative <300 ng/mL Final    Opiate Scrn, Ur 12/07/2021 Neg  Negative <300 ng/mL Final    Comment: \"Therapeutic levels of pain medication, especially oxycontin and synthetic  opioids, may not be detected by this Methodology. Pain management screen  panel  Drug panel-PM-Hi Res Ur, Interp (PAIN) should be considered for drug  monitoring \".  PCP Screen, Urine 12/07/2021 Neg  Negative <25 ng/mL Final    Methadone Screen, Urine 12/07/2021 Neg  Negative <300 ng/mL Final    Propoxyphene Scrn, Ur 12/07/2021 Neg  Negative <300 ng/mL Final    Oxycodone Urine 12/07/2021 Neg  Negative <100 ng/ml Final    pH, UA 12/07/2021 6.0   Final    Comment: Urine pH less than 5.0 or greater than 8.0 may indicate sample adulteration. Another sample should be collected if clinically  indicated.  Drug Screen Comment: 12/07/2021 see below   Final    Comment: This method is a screening test to detect only these drug  classes as part of a medical workup. Confirmatory testing  by another method should be ordered if clinically indicated.  Ethanol Lvl 12/07/2021 None Detected  mg/dL Final    Comment:    None Detected  Conversion factor:  100 mg/dl = .100 g/dl  For Medical Purposes Only      Acetaminophen Level 12/07/2021 <5* 10 - 30 ug/mL Final    Comment: Therapeutic Range: 10.0-30.0 ug/mL  Toxic: >=420 ug/mL      Salicylate, Serum 62/74/1642 <0.3* 15.0 - 30.0 mg/dL Final    Comment: Therapeutic Range: 15.0-30.0 mg/dL  Toxic: >30.0 mg/dL      SARS-CoV-2, NAAT 12/07/2021 Not Detected  Not Detected Final    Comment: Rapid NAAT:   Negative results should be treated as presumptive and,  if inconsistent with clinical signs and symptoms or necessary for  patient management, should be tested with an alternative molecular  assay.  Negative results do not preclude SARS-CoV-2 infection and  should not be used as the sole basis for patient management decisions. This test has been authorized by the FDA under an Emergency Use  Authorization (EUA) for use by authorized laboratories. Fact sheet for Healthcare Providers:  [employer]  Fact sheet for Patients: [employer]    METHODOLOGY: Isothermal Nucleic Acid Amplification      Total Protein 12/07/2021 7.0  6.4 - 8.2 g/dL Final    Albumin 12/07/2021 4.4  3.4 - 5.0 g/dL Final    Alkaline Phosphatase 12/07/2021 67  40 - 129 U/L Final    ALT 12/07/2021 17  10 - 40 U/L Final    AST 12/07/2021 24  15 - 37 U/L Final    Total Bilirubin 12/07/2021 0.3  0.0 - 1.0 mg/dL Final    Bilirubin, Direct 12/07/2021 <0.2  0.0 - 0.3 mg/dL Final    Bilirubin, Indirect 12/07/2021 see below  0.0 - 1.0 mg/dL Final    Comment: Indirect Bilirubin cannot be calculated since Total Bilirubin  and/or Direct Bilirubin is below measurable range.       Magnesium 12/07/2021 2.40  1.80 - 2.40 mg/dL Final        Mental Status Exam at Discharge:  Level of consciousness:  awake  Appearance: well-developed, well-nourished  Behavior/Motor:  no abnormalities noted normal gait and station AIMS: 0  Attitude toward examiner: un ooperative, attentive and poor eye contact  Speech:  spontaneous, normal rate, normal volume and well articulated  Mood:  Irritable   Affect:  mood congruent Anxiety: mild  Hallucinations: Absent  Thought processes:  coherent Attention span, Concentration & Attention:  attention span and concentration were age appropriate  Thought content:   no evidence of delusions OCD: none    Insight: poor  insight and judgment Cognition:  oriented to person, place, and time  Fund of Knowledge: average  IQ:average Memory: intact  Suicide:  No specific plan to harm self  Sleep: sleeps through the night  Appetite: ok   Reassess Jessica Risk:  no specific plan to harm self Pt has phone numbers to contact if suicidal thoughts recur and states pt will return to the hospital if suicidal feelings return.    Hospital Routine Meds:     Hospital PRN Meds:    Discharge Meds:    Discharge Medication List as of 12/11/2021 10:05 AM            Disposition referred to shelter but refused    Follow Up:  See Discharge Instructions

## 2022-04-22 ENCOUNTER — TELEPHONE (OUTPATIENT)
Dept: PSYCHIATRY | Age: 27
End: 2022-04-22

## 2022-04-22 NOTE — TELEPHONE ENCOUNTER
Spoke with pt's aunt to find out where to send pt belongings we found in the safe. No known number for patient.  Belongings to be mailed to [employer]

## 2024-04-20 NOTE — PROGRESS NOTES
Patient reluctant to answer admission questions. Did state, \"whatever I said in the emergency room is the same. \"  When asked if he could contract for safety he stated, \"yes, I am fine here in the hospital.\"  Dr. Willy Varela notified, suicide precautions discontinued. Alert-The patient is alert, awake and responds to voice. The patient is oriented to time, place, and person. The triage nurse is able to obtain subjective information.